# Patient Record
Sex: FEMALE | Race: OTHER | HISPANIC OR LATINO | URBAN - METROPOLITAN AREA
[De-identification: names, ages, dates, MRNs, and addresses within clinical notes are randomized per-mention and may not be internally consistent; named-entity substitution may affect disease eponyms.]

---

## 2018-02-05 ENCOUNTER — EMERGENCY (EMERGENCY)
Facility: HOSPITAL | Age: 23
LOS: 1 days | Discharge: HOME | End: 2018-02-05
Attending: EMERGENCY MEDICINE
Payer: MEDICAID

## 2018-02-05 VITALS
TEMPERATURE: 101 F | RESPIRATION RATE: 20 BRPM | DIASTOLIC BLOOD PRESSURE: 79 MMHG | OXYGEN SATURATION: 99 % | WEIGHT: 136.91 LBS | HEART RATE: 109 BPM | SYSTOLIC BLOOD PRESSURE: 121 MMHG

## 2018-02-05 VITALS
SYSTOLIC BLOOD PRESSURE: 117 MMHG | OXYGEN SATURATION: 100 % | TEMPERATURE: 99 F | RESPIRATION RATE: 18 BRPM | HEART RATE: 88 BPM | DIASTOLIC BLOOD PRESSURE: 69 MMHG

## 2018-02-05 LAB
ALBUMIN SERPL ELPH-MCNC: 4.4 G/DL — SIGNIFICANT CHANGE UP (ref 3.5–5)
ALP SERPL-CCNC: 106 U/L — SIGNIFICANT CHANGE UP (ref 40–120)
ALT FLD-CCNC: 41 U/L DA — SIGNIFICANT CHANGE UP (ref 10–60)
ANION GAP SERPL CALC-SCNC: 10 MMOL/L — SIGNIFICANT CHANGE UP (ref 5–17)
APPEARANCE UR: CLEAR — SIGNIFICANT CHANGE UP
AST SERPL-CCNC: 17 U/L — SIGNIFICANT CHANGE UP (ref 10–40)
BASOPHILS # BLD AUTO: 0.1 K/UL — SIGNIFICANT CHANGE UP (ref 0–0.2)
BASOPHILS NFR BLD AUTO: 0.9 % — SIGNIFICANT CHANGE UP (ref 0–2)
BILIRUB SERPL-MCNC: 0.3 MG/DL — SIGNIFICANT CHANGE UP (ref 0.2–1.2)
BILIRUB UR-MCNC: NEGATIVE — SIGNIFICANT CHANGE UP
BUN SERPL-MCNC: 8 MG/DL — SIGNIFICANT CHANGE UP (ref 7–18)
CALCIUM SERPL-MCNC: 8.6 MG/DL — SIGNIFICANT CHANGE UP (ref 8.4–10.5)
CHLORIDE SERPL-SCNC: 104 MMOL/L — SIGNIFICANT CHANGE UP (ref 96–108)
CO2 SERPL-SCNC: 24 MMOL/L — SIGNIFICANT CHANGE UP (ref 22–31)
COLOR SPEC: YELLOW — SIGNIFICANT CHANGE UP
CREAT SERPL-MCNC: 0.66 MG/DL — SIGNIFICANT CHANGE UP (ref 0.5–1.3)
DIFF PNL FLD: ABNORMAL
EOSINOPHIL # BLD AUTO: 0 K/UL — SIGNIFICANT CHANGE UP (ref 0–0.5)
EOSINOPHIL NFR BLD AUTO: 0.2 % — SIGNIFICANT CHANGE UP (ref 0–6)
FLUBV RNA SPEC QL NAA+PROBE: DETECTED
GLUCOSE SERPL-MCNC: 98 MG/DL — SIGNIFICANT CHANGE UP (ref 70–99)
GLUCOSE UR QL: NEGATIVE — SIGNIFICANT CHANGE UP
HCG UR QL: NEGATIVE — SIGNIFICANT CHANGE UP
HCT VFR BLD CALC: 43.2 % — SIGNIFICANT CHANGE UP (ref 34.5–45)
HGB BLD-MCNC: 13.4 G/DL — SIGNIFICANT CHANGE UP (ref 11.5–15.5)
KETONES UR-MCNC: NEGATIVE — SIGNIFICANT CHANGE UP
LACTATE SERPL-SCNC: 1.2 MMOL/L — SIGNIFICANT CHANGE UP (ref 0.7–2)
LEUKOCYTE ESTERASE UR-ACNC: ABNORMAL
LYMPHOCYTES # BLD AUTO: 0.6 K/UL — LOW (ref 1–3.3)
LYMPHOCYTES # BLD AUTO: 9.7 % — LOW (ref 13–44)
MCHC RBC-ENTMCNC: 28.9 PG — SIGNIFICANT CHANGE UP (ref 27–34)
MCHC RBC-ENTMCNC: 31.1 GM/DL — LOW (ref 32–36)
MCV RBC AUTO: 93 FL — SIGNIFICANT CHANGE UP (ref 80–100)
MONOCYTES # BLD AUTO: 0.5 K/UL — SIGNIFICANT CHANGE UP (ref 0–0.9)
MONOCYTES NFR BLD AUTO: 8.5 % — SIGNIFICANT CHANGE UP (ref 2–14)
NEUTROPHILS # BLD AUTO: 4.9 K/UL — SIGNIFICANT CHANGE UP (ref 1.8–7.4)
NEUTROPHILS NFR BLD AUTO: 80.8 % — HIGH (ref 43–77)
NITRITE UR-MCNC: NEGATIVE — SIGNIFICANT CHANGE UP
PH UR: 6.5 — SIGNIFICANT CHANGE UP (ref 5–8)
PLATELET # BLD AUTO: 233 K/UL — SIGNIFICANT CHANGE UP (ref 150–400)
POTASSIUM SERPL-MCNC: 3.5 MMOL/L — SIGNIFICANT CHANGE UP (ref 3.5–5.3)
POTASSIUM SERPL-SCNC: 3.5 MMOL/L — SIGNIFICANT CHANGE UP (ref 3.5–5.3)
PROT SERPL-MCNC: 9 G/DL — HIGH (ref 6–8.3)
PROT UR-MCNC: NEGATIVE — SIGNIFICANT CHANGE UP
RAPID RVP RESULT: DETECTED
RBC # BLD: 4.65 M/UL — SIGNIFICANT CHANGE UP (ref 3.8–5.2)
RBC # FLD: 12.1 % — SIGNIFICANT CHANGE UP (ref 10.3–14.5)
SODIUM SERPL-SCNC: 138 MMOL/L — SIGNIFICANT CHANGE UP (ref 135–145)
SP GR SPEC: 1.01 — SIGNIFICANT CHANGE UP (ref 1.01–1.02)
UROBILINOGEN FLD QL: NEGATIVE — SIGNIFICANT CHANGE UP
WBC # BLD: 6 K/UL — SIGNIFICANT CHANGE UP (ref 3.8–10.5)
WBC # FLD AUTO: 6 K/UL — SIGNIFICANT CHANGE UP (ref 3.8–10.5)

## 2018-02-05 PROCEDURE — 87486 CHLMYD PNEUM DNA AMP PROBE: CPT

## 2018-02-05 PROCEDURE — 87633 RESP VIRUS 12-25 TARGETS: CPT

## 2018-02-05 PROCEDURE — 99283 EMERGENCY DEPT VISIT LOW MDM: CPT | Mod: 25

## 2018-02-05 PROCEDURE — 71046 X-RAY EXAM CHEST 2 VIEWS: CPT | Mod: 26

## 2018-02-05 PROCEDURE — 87798 DETECT AGENT NOS DNA AMP: CPT

## 2018-02-05 PROCEDURE — 81025 URINE PREGNANCY TEST: CPT

## 2018-02-05 PROCEDURE — 99283 EMERGENCY DEPT VISIT LOW MDM: CPT

## 2018-02-05 PROCEDURE — 80053 COMPREHEN METABOLIC PANEL: CPT

## 2018-02-05 PROCEDURE — 83605 ASSAY OF LACTIC ACID: CPT

## 2018-02-05 PROCEDURE — 87581 M.PNEUMON DNA AMP PROBE: CPT

## 2018-02-05 PROCEDURE — 81001 URINALYSIS AUTO W/SCOPE: CPT

## 2018-02-05 PROCEDURE — 71046 X-RAY EXAM CHEST 2 VIEWS: CPT

## 2018-02-05 PROCEDURE — 85027 COMPLETE CBC AUTOMATED: CPT

## 2018-02-05 RX ORDER — SODIUM CHLORIDE 9 MG/ML
1000 INJECTION INTRAMUSCULAR; INTRAVENOUS; SUBCUTANEOUS ONCE
Qty: 0 | Refills: 0 | Status: COMPLETED | OUTPATIENT
Start: 2018-02-05 | End: 2018-02-05

## 2018-02-05 RX ORDER — IBUPROFEN 200 MG
600 TABLET ORAL ONCE
Qty: 0 | Refills: 0 | Status: COMPLETED | OUTPATIENT
Start: 2018-02-05 | End: 2018-02-05

## 2018-02-05 RX ADMIN — Medication 600 MILLIGRAM(S): at 18:17

## 2018-02-05 RX ADMIN — SODIUM CHLORIDE 1000 MILLILITER(S): 9 INJECTION INTRAMUSCULAR; INTRAVENOUS; SUBCUTANEOUS at 18:17

## 2018-02-05 NOTE — ED PROVIDER NOTE - ATTENDING CONTRIBUTION TO CARE
pt comes in c/o intermittent fever myalgias , anorexia , pleuritic chest pain and meds , steroids   exam wnl

## 2018-02-05 NOTE — ED PROVIDER NOTE - PHYSICAL EXAMINATION
PE: GEN: Awake, alert, interactive, NAD, non-toxic appearing.   HEAD: Atraumatic  EYES: PERRLA, sclera white, conjunctiva pink  EARS: bilat cerumen impaction  NOSE: patent, without obstruction or congestion, no epistaxis  MOUTH: Patent, uvula midline, no tonsillar edema/erythema/exudate, no pharyngeal erythema  CARDIAC: FAST rate and rhythm, S1,S2, no murmur/rub/gallop. Strong central and peripheral pulses, Brisk cap refill, no evident pedal edema.   RESP: MILD distress noted. L/S clear = Bilat without accessory muscle use, wheeze, rhonchi, rales.   ABD: soft, supple, non-tender, no guarding. BS x 4, normoactive.   NEURO: AOx3, CN II-XII grossly intact without focal deficit.   MSK: Moving all extremities with no apparent deformities. neg CVAT  SKIN: Warm, dry, normal color, without apparent rashes.

## 2018-02-05 NOTE — ED PROVIDER NOTE - NS ED ROS FT
Constitutional: + Fever, + Chills, + Anorexia, + Fatigue  Eyes: + Discharge, - Irritation, - Redness, - Visual changes, - Light sensitivity  EARS: - Ear Pain, - Apparent hearing problems  NOSE: + Congestion, - Bloody nose  MOUTH/THROAT: - Vocal Changes, - Drooling, - Sore throat  NECK: - Lumps, - Stiffness, - Pain  CV: - Palpitations, - Chest Pain, - Edema   RESP:  + Cough, + Shortness of Breath, - Dyspnea on Exertion, - Trouble speaking, + Pain with breathing, - Wheezing  GI: + Diarrhea, - Constipation, - Bloody stools, - Nausea, - Vomiting, - Abdominal Pain  : - Dysuria, -Frequency, - Hematuria, - Hesitancy, - Incontinence  MSK: + Myalgias, - Arthralgias, - Weakness, - Deformities, - Injuries  SKIN: - Color change, - Rash, - Swelling, - Bruises, - Wounds  NEURO: - Change in behavior, - Dec. Alertness, - Headache, - Dizziness, - Change in speech, + Weakness

## 2018-02-05 NOTE — ED PROVIDER NOTE - OBJECTIVE STATEMENT
this is a 21 yo fem, reportedly healthy, presenting to the ED with complaints of persistent intermittent chills, myalgias, anorexia, pleuritic chest pain, cough/congestion, shortness of breath despite being on augmenting, prednisone, tessalon Perles from another ED on Saturday. states symptoms have persisted for 3 weeks. pt endorses feeling weak and had an episode of diarrhea this morning. pt has no other complaints.

## 2018-02-06 LAB
CULTURE RESULTS: NO GROWTH — SIGNIFICANT CHANGE UP
SPECIMEN SOURCE: SIGNIFICANT CHANGE UP

## 2018-02-11 LAB
CULTURE RESULTS: SIGNIFICANT CHANGE UP
CULTURE RESULTS: SIGNIFICANT CHANGE UP
SPECIMEN SOURCE: SIGNIFICANT CHANGE UP
SPECIMEN SOURCE: SIGNIFICANT CHANGE UP

## 2018-05-04 NOTE — ED ADULT NURSE NOTE - PT NEEDS ASSIST
Pt resting in bed with call light in reach. Comfort needs addressed. IV fluids infusing. No further needs at this time. Pt was voided while in the bathroom, but forgot to collect specimen. Pt will attempt to collect specimen later.    no

## 2019-01-02 NOTE — ED PROVIDER NOTE - PMH
Erythromycin Counseling:  I discussed with the patient the risks of erythromycin including but not limited to GI upset, allergic reaction, drug rash, diarrhea, increase in liver enzymes, and yeast infections. High Dose Vitamin A Pregnancy And Lactation Text: High dose vitamin A therapy is contraindicated during pregnancy and breast feeding. Tetracycline Pregnancy And Lactation Text: This medication is Pregnancy Category D and not consider safe during pregnancy. It is also excreted in breast milk. Tazorac Pregnancy And Lactation Text: This medication is not safe during pregnancy. It is unknown if this medication is excreted in breast milk. Topical Clindamycin Pregnancy And Lactation Text: This medication is Pregnancy Category B and is considered safe during pregnancy. It is unknown if it is excreted in breast milk. Topical Sulfur Applications Counseling: Topical Sulfur Counseling: Patient counseled that this medication may cause skin irritation or allergic reactions.  In the event of skin irritation, the patient was advised to reduce the amount of the drug applied or use it less frequently.   The patient verbalized understanding of the proper use and possible adverse effects of topical sulfur application.  All of the patient's questions and concerns were addressed. Isotretinoin Pregnancy And Lactation Text: This medication is Pregnancy Category X and is considered extremely dangerous during pregnancy. It is unknown if it is excreted in breast milk. Birth Control Pills Counseling: Birth Control Pill Counseling: I discussed with the patient the potential side effects of OCPs including but not limited to increased risk of stroke, heart attack, thrombophlebitis, deep venous thrombosis, hepatic adenomas, breast changes, GI upset, headaches, and depression.  The patient verbalized understanding of the proper use and possible adverse effects of OCPs. All of the patient's questions and concerns were addressed. Spironolactone Pregnancy And Lactation Text: This medication can cause feminization of the male fetus and should be avoided during pregnancy. The active metabolite is also found in breast milk. Minocycline Counseling: Patient advised regarding possible photosensitivity and discoloration of the teeth, skin, lips, tongue and gums.  Patient instructed to avoid sunlight, if possible.  When exposed to sunlight, patients should wear protective clothing, sunglasses, and sunscreen.  The patient was instructed to call the office immediately if the following severe adverse effects occur:  hearing changes, easy bruising/bleeding, severe headache, or vision changes.  The patient verbalized understanding of the proper use and possible adverse effects of minocycline.  All of the patient's questions and concerns were addressed. Include Pregnancy/Lactation Warning?: No Dapsone Pregnancy And Lactation Text: This medication is Pregnancy Category C and is not considered safe during pregnancy or breast feeding. Birth Control Pills Pregnancy And Lactation Text: This medication should be avoided if pregnant and for the first 30 days post-partum. Tazorac Counseling:  Patient advised that medication is irritating and drying.  Patient may need to apply sparingly and wash off after an hour before eventually leaving it on overnight.  The patient verbalized understanding of the proper use and possible adverse effects of tazorac.  All of the patient's questions and concerns were addressed. No pertinent past medical history Topical Clindamycin Counseling: Patient counseled that this medication may cause skin irritation or allergic reactions.  In the event of skin irritation, the patient was advised to reduce the amount of the drug applied or use it less frequently.   The patient verbalized understanding of the proper use and possible adverse effects of clindamycin.  All of the patient's questions and concerns were addressed. Topical Sulfur Applications Pregnancy And Lactation Text: This medication is Pregnancy Category C and has an unknown safety profile during pregnancy. It is unknown if this topical medication is excreted in breast milk. Spironolactone Counseling: Patient advised regarding risks of diarrhea, abdominal pain, hyperkalemia, birth defects (for female patients), liver toxicity and renal toxicity. The patient may need blood work to monitor liver and kidney function and potassium levels while on therapy. The patient verbalized understanding of the proper use and possible adverse effects of spironolactone.  All of the patient's questions and concerns were addressed. High Dose Vitamin A Counseling: Side effects reviewed, pt to contact office should one occur. Azithromycin Counseling:  I discussed with the patient the risks of azithromycin including but not limited to GI upset, allergic reaction, drug rash, diarrhea, and yeast infections. Erythromycin Pregnancy And Lactation Text: This medication is Pregnancy Category B and is considered safe during pregnancy. It is also excreted in breast milk. Tetracycline Counseling: Patient counseled regarding possible photosensitivity and increased risk for sunburn.  Patient instructed to avoid sunlight, if possible.  When exposed to sunlight, patients should wear protective clothing, sunglasses, and sunscreen.  The patient was instructed to call the office immediately if the following severe adverse effects occur:  hearing changes, easy bruising/bleeding, severe headache, or vision changes.  The patient verbalized understanding of the proper use and possible adverse effects of tetracycline.  All of the patient's questions and concerns were addressed. Patient understands to avoid pregnancy while on therapy due to potential birth defects. Detail Level: Detailed Benzoyl Peroxide Pregnancy And Lactation Text: This medication is Pregnancy Category C. It is unknown if benzoyl peroxide is excreted in breast milk. Isotretinoin Counseling: Patient should get monthly blood tests, not donate blood, not drive at night if vision affected, not share medication, and not undergo elective surgery for 6 months after tx completed. Side effects reviewed, pt to contact office should one occur. Bactrim Counseling:  I discussed with the patient the risks of sulfa antibiotics including but not limited to GI upset, allergic reaction, drug rash, diarrhea, dizziness, photosensitivity, and yeast infections.  Rarely, more serious reactions can occur including but not limited to aplastic anemia, agranulocytosis, methemoglobinemia, blood dyscrasias, liver or kidney failure, lung infiltrates or desquamative/blistering drug rashes. Topical Retinoid counseling:  Patient advised to apply a pea-sized amount only at bedtime and wait 30 minutes after washing their face before applying.  If too drying, patient may add a non-comedogenic moisturizer. The patient verbalized understanding of the proper use and possible adverse effects of retinoids.  All of the patient's questions and concerns were addressed. Topical Retinoid Pregnancy And Lactation Text: This medication is Pregnancy Category C. It is unknown if this medication is excreted in breast milk. Dapsone Counseling: I discussed with the patient the risks of dapsone including but not limited to hemolytic anemia, agranulocytosis, rashes, methemoglobinemia, kidney failure, peripheral neuropathy, headaches, GI upset, and liver toxicity.  Patients who start dapsone require monitoring including baseline LFTs and weekly CBCs for the first month, then every month thereafter.  The patient verbalized understanding of the proper use and possible adverse effects of dapsone.  All of the patient's questions and concerns were addressed. Azithromycin Pregnancy And Lactation Text: This medication is considered safe during pregnancy and is also secreted in breast milk. Benzoyl Peroxide Counseling: Patient counseled that medicine may cause skin irritation and bleach clothing.  In the event of skin irritation, the patient was advised to reduce the amount of the drug applied or use it less frequently.   The patient verbalized understanding of the proper use and possible adverse effects of benzoyl peroxide.  All of the patient's questions and concerns were addressed. Doxycycline Pregnancy And Lactation Text: This medication is Pregnancy Category D and not consider safe during pregnancy. It is also excreted in breast milk but is considered safe for shorter treatment courses. Bactrim Pregnancy And Lactation Text: This medication is Pregnancy Category D and is known to cause fetal risk.  It is also excreted in breast milk. Detail Level: Zone Doxycycline Counseling:  Patient counseled regarding possible photosensitivity and increased risk for sunburn.  Patient instructed to avoid sunlight, if possible.  When exposed to sunlight, patients should wear protective clothing, sunglasses, and sunscreen.  The patient was instructed to call the office immediately if the following severe adverse effects occur:  hearing changes, easy bruising/bleeding, severe headache, or vision changes.  The patient verbalized understanding of the proper use and possible adverse effects of doxycycline.  All of the patient's questions and concerns were addressed.

## 2023-04-21 ENCOUNTER — EMERGENCY (EMERGENCY)
Facility: HOSPITAL | Age: 28
LOS: 1 days | Discharge: HOME | End: 2023-04-21
Attending: STUDENT IN AN ORGANIZED HEALTH CARE EDUCATION/TRAINING PROGRAM
Payer: MEDICAID

## 2023-04-21 VITALS
TEMPERATURE: 100 F | DIASTOLIC BLOOD PRESSURE: 79 MMHG | RESPIRATION RATE: 18 BRPM | SYSTOLIC BLOOD PRESSURE: 124 MMHG | WEIGHT: 161.38 LBS | HEART RATE: 80 BPM | OXYGEN SATURATION: 100 %

## 2023-04-21 LAB
ALBUMIN SERPL ELPH-MCNC: 3.6 G/DL — SIGNIFICANT CHANGE UP (ref 3.5–5)
ALP SERPL-CCNC: 78 U/L — SIGNIFICANT CHANGE UP (ref 40–120)
ALT FLD-CCNC: 51 U/L DA — SIGNIFICANT CHANGE UP (ref 10–60)
ANION GAP SERPL CALC-SCNC: 5 MMOL/L — SIGNIFICANT CHANGE UP (ref 5–17)
AST SERPL-CCNC: 28 U/L — SIGNIFICANT CHANGE UP (ref 10–40)
BASOPHILS # BLD AUTO: 0.04 K/UL — SIGNIFICANT CHANGE UP (ref 0–0.2)
BASOPHILS NFR BLD AUTO: 0.5 % — SIGNIFICANT CHANGE UP (ref 0–2)
BILIRUB SERPL-MCNC: 0.5 MG/DL — SIGNIFICANT CHANGE UP (ref 0.2–1.2)
BLD GP AB SCN SERPL QL: SIGNIFICANT CHANGE UP
BUN SERPL-MCNC: 9 MG/DL — SIGNIFICANT CHANGE UP (ref 7–18)
CALCIUM SERPL-MCNC: 9.5 MG/DL — SIGNIFICANT CHANGE UP (ref 8.4–10.5)
CHLORIDE SERPL-SCNC: 108 MMOL/L — SIGNIFICANT CHANGE UP (ref 96–108)
CO2 SERPL-SCNC: 26 MMOL/L — SIGNIFICANT CHANGE UP (ref 22–31)
CREAT SERPL-MCNC: 0.64 MG/DL — SIGNIFICANT CHANGE UP (ref 0.5–1.3)
EGFR: 123 ML/MIN/1.73M2 — SIGNIFICANT CHANGE UP
EOSINOPHIL # BLD AUTO: 0.18 K/UL — SIGNIFICANT CHANGE UP (ref 0–0.5)
EOSINOPHIL NFR BLD AUTO: 2 % — SIGNIFICANT CHANGE UP (ref 0–6)
GLUCOSE SERPL-MCNC: 107 MG/DL — HIGH (ref 70–99)
HCG SERPL-ACNC: HIGH MIU/ML
HCT VFR BLD CALC: 35.2 % — SIGNIFICANT CHANGE UP (ref 34.5–45)
HGB BLD-MCNC: 11.6 G/DL — SIGNIFICANT CHANGE UP (ref 11.5–15.5)
IMM GRANULOCYTES NFR BLD AUTO: 0.2 % — SIGNIFICANT CHANGE UP (ref 0–0.9)
LYMPHOCYTES # BLD AUTO: 2 K/UL — SIGNIFICANT CHANGE UP (ref 1–3.3)
LYMPHOCYTES # BLD AUTO: 22.5 % — SIGNIFICANT CHANGE UP (ref 13–44)
MAGNESIUM SERPL-MCNC: 2.2 MG/DL — SIGNIFICANT CHANGE UP (ref 1.6–2.6)
MCHC RBC-ENTMCNC: 29.5 PG — SIGNIFICANT CHANGE UP (ref 27–34)
MCHC RBC-ENTMCNC: 33 GM/DL — SIGNIFICANT CHANGE UP (ref 32–36)
MCV RBC AUTO: 89.6 FL — SIGNIFICANT CHANGE UP (ref 80–100)
MONOCYTES # BLD AUTO: 0.46 K/UL — SIGNIFICANT CHANGE UP (ref 0–0.9)
MONOCYTES NFR BLD AUTO: 5.2 % — SIGNIFICANT CHANGE UP (ref 2–14)
NEUTROPHILS # BLD AUTO: 6.17 K/UL — SIGNIFICANT CHANGE UP (ref 1.8–7.4)
NEUTROPHILS NFR BLD AUTO: 69.6 % — SIGNIFICANT CHANGE UP (ref 43–77)
NRBC # BLD: 0 /100 WBCS — SIGNIFICANT CHANGE UP (ref 0–0)
PLATELET # BLD AUTO: 304 K/UL — SIGNIFICANT CHANGE UP (ref 150–400)
POTASSIUM SERPL-MCNC: 4.3 MMOL/L — SIGNIFICANT CHANGE UP (ref 3.5–5.3)
POTASSIUM SERPL-SCNC: 4.3 MMOL/L — SIGNIFICANT CHANGE UP (ref 3.5–5.3)
PROT SERPL-MCNC: 7.6 G/DL — SIGNIFICANT CHANGE UP (ref 6–8.3)
RBC # BLD: 3.93 M/UL — SIGNIFICANT CHANGE UP (ref 3.8–5.2)
RBC # FLD: 13.2 % — SIGNIFICANT CHANGE UP (ref 10.3–14.5)
SODIUM SERPL-SCNC: 139 MMOL/L — SIGNIFICANT CHANGE UP (ref 135–145)
WBC # BLD: 8.87 K/UL — SIGNIFICANT CHANGE UP (ref 3.8–10.5)
WBC # FLD AUTO: 8.87 K/UL — SIGNIFICANT CHANGE UP (ref 3.8–10.5)

## 2023-04-21 PROCEDURE — 83735 ASSAY OF MAGNESIUM: CPT

## 2023-04-21 PROCEDURE — 99284 EMERGENCY DEPT VISIT MOD MDM: CPT

## 2023-04-21 PROCEDURE — 84702 CHORIONIC GONADOTROPIN TEST: CPT

## 2023-04-21 PROCEDURE — 86850 RBC ANTIBODY SCREEN: CPT

## 2023-04-21 PROCEDURE — 86901 BLOOD TYPING SEROLOGIC RH(D): CPT

## 2023-04-21 PROCEDURE — 76801 OB US < 14 WKS SINGLE FETUS: CPT

## 2023-04-21 PROCEDURE — 86900 BLOOD TYPING SEROLOGIC ABO: CPT

## 2023-04-21 PROCEDURE — 80053 COMPREHEN METABOLIC PANEL: CPT

## 2023-04-21 PROCEDURE — 76817 TRANSVAGINAL US OBSTETRIC: CPT

## 2023-04-21 PROCEDURE — 99284 EMERGENCY DEPT VISIT MOD MDM: CPT | Mod: 25

## 2023-04-21 PROCEDURE — 36415 COLL VENOUS BLD VENIPUNCTURE: CPT

## 2023-04-21 PROCEDURE — 85025 COMPLETE CBC W/AUTO DIFF WBC: CPT

## 2023-04-21 NOTE — ED PROVIDER NOTE - CLINICAL SUMMARY MEDICAL DECISION MAKING FREE TEXT BOX
27F presenting with abdominal pain and vaginal bleeding during pregnancy. concern for molar pregnancy vs ectopic vs miscarriage. will get labs, US. will reassess.

## 2023-04-21 NOTE — ED PROVIDER NOTE - PROGRESS NOTE DETAILS
patient and family updated on results. has OB followup on Monday. patient now reporting that she passed a sac like structure while at home. stable for outpatient followup.

## 2023-04-21 NOTE — ED PROVIDER NOTE - PATIENT PORTAL LINK FT
You can access the FollowMyHealth Patient Portal offered by [facility] by registering at the following website: [email or web address]. By joining [employer]’s FollowMyHealth portal, you will also be able to view your health information using other applications (apps) compatible with our system.

## 2023-04-21 NOTE — ED PROVIDER NOTE - OBJECTIVE STATEMENT
27F, 5 weeks gestation, presenting with abdominal pain and vaginal bleeding. patient had ultrasound with her OB today and there was concern for molar pregnancy. around 6pm she began to have heavy vaginal bleeding and some right lower abdominal pain.

## 2023-04-21 NOTE — ED PROVIDER NOTE - PHYSICAL EXAMINATION
General: well appearing female, no acute distress   HEENT: normocephalic, atraumatic   Respiratory: normal work of breathing  Abdomen: soft, RLQ tenderness to palpation   MSK: no swelling or tenderness of lower extremities, moving all extremities spontaneously   Skin: warm, dry   Neuro: A&Ox3  Psych: appropriate affect

## 2023-04-21 NOTE — ED PROVIDER NOTE - NSFOLLOWUPINSTRUCTIONS_ED_ALL_ED_FT
You were seen in the emergency department for abdominal pain and vaginal bleeding during pregnancy.     Please follow-up your OB/GYN within the next 48 hours.     If you have any worsening symptoms, severe abdominal pain, chest pain, trouble breathing dizziness, please return to the emergency department.

## 2023-04-22 VITALS
TEMPERATURE: 98 F | SYSTOLIC BLOOD PRESSURE: 111 MMHG | RESPIRATION RATE: 18 BRPM | OXYGEN SATURATION: 99 % | HEART RATE: 82 BPM | DIASTOLIC BLOOD PRESSURE: 72 MMHG

## 2023-04-22 PROCEDURE — 76801 OB US < 14 WKS SINGLE FETUS: CPT | Mod: 26

## 2023-04-22 PROCEDURE — 76817 TRANSVAGINAL US OBSTETRIC: CPT | Mod: 26

## 2023-04-22 NOTE — ED ADULT NURSE NOTE - NS_SISCREENINGSR_GEN_ALL_ED
Patient for monthly catheter change per provider. Existing catheter balloon deflated and removed without difficulty. Patient prepped in usual sterile fashion. 2% lidocaine urojet used prior to insertion of 18F coude catheter. About 10 ml of yellow urine returned. Balloon filled with 10 mL sterile water. Catheter secured with new stat lock, connected to tubing and leg bag. Patient tolerated procedure well and will follow up as needed/directed or in 4 weeks for catheter change.    
Negative

## 2023-07-27 NOTE — ED ADULT NURSE NOTE - CHIEF COMPLAINT QUOTE
flu-like symptoms x 3 weeks Tremfya Pregnancy And Lactation Text: The risk during pregnancy and breastfeeding is uncertain with this medication.

## 2023-11-02 ENCOUNTER — EMERGENCY (EMERGENCY)
Facility: HOSPITAL | Age: 28
LOS: 1 days | End: 2023-11-02
Attending: STUDENT IN AN ORGANIZED HEALTH CARE EDUCATION/TRAINING PROGRAM
Payer: MEDICAID

## 2023-11-02 ENCOUNTER — OUTPATIENT (OUTPATIENT)
Dept: OUTPATIENT SERVICES | Facility: HOSPITAL | Age: 28
LOS: 1 days | End: 2023-11-02
Payer: MEDICAID

## 2023-11-02 VITALS
RESPIRATION RATE: 18 BRPM | HEIGHT: 60 IN | HEART RATE: 114 BPM | WEIGHT: 164.91 LBS | TEMPERATURE: 98 F | OXYGEN SATURATION: 99 % | SYSTOLIC BLOOD PRESSURE: 126 MMHG | DIASTOLIC BLOOD PRESSURE: 85 MMHG

## 2023-11-02 DIAGNOSIS — Z3A.00 WEEKS OF GESTATION OF PREGNANCY NOT SPECIFIED: ICD-10-CM

## 2023-11-02 DIAGNOSIS — O26.899 OTHER SPECIFIED PREGNANCY RELATED CONDITIONS, UNSPECIFIED TRIMESTER: ICD-10-CM

## 2023-11-02 PROCEDURE — 99285 EMERGENCY DEPT VISIT HI MDM: CPT

## 2023-11-02 PROCEDURE — 99282 EMERGENCY DEPT VISIT SF MDM: CPT

## 2023-11-02 NOTE — ED PROVIDER NOTE - CLINICAL SUMMARY MEDICAL DECISION MAKING FREE TEXT BOX
Rapid assessment -   Pt reports she is 6months pregnant. Having itchiness. VSS. Well appearing. No distress. Ok to go to L&D for assessment since patient is more than 20 week pregnant.

## 2023-11-02 NOTE — ED ADULT TRIAGE NOTE - CHIEF COMPLAINT QUOTE
c/o generalized itchiness since 2 pm, denies any swelling or tightness in oral airway or shortness of breath, pt states she is approx 6 months pregnant, denies any GYN complaints

## 2023-11-02 NOTE — ED ADULT NURSE NOTE - NS ED NURSE NOTE DISPO AOU DETAILS FT
Pt stated she is 6 weeks pregnant c/o generalized body itchy, denies abd or back pain or vaginal bleeding or leaking. pt was seen by Dr weir.

## 2023-11-03 PROCEDURE — 59025 FETAL NON-STRESS TEST: CPT

## 2023-11-03 PROCEDURE — G0463: CPT

## 2023-11-03 RX ORDER — DIPHENHYDRAMINE HCL 50 MG
25 CAPSULE ORAL ONCE
Refills: 0 | Status: COMPLETED | OUTPATIENT
Start: 2023-11-03 | End: 2023-11-03

## 2023-11-03 RX ADMIN — Medication 25 MILLIGRAM(S): at 00:20

## 2024-01-15 ENCOUNTER — OUTPATIENT (OUTPATIENT)
Dept: OUTPATIENT SERVICES | Facility: HOSPITAL | Age: 29
LOS: 1 days | End: 2024-01-15
Payer: MEDICAID

## 2024-01-15 VITALS
SYSTOLIC BLOOD PRESSURE: 119 MMHG | HEART RATE: 118 BPM | TEMPERATURE: 98 F | DIASTOLIC BLOOD PRESSURE: 76 MMHG | RESPIRATION RATE: 17 BRPM | OXYGEN SATURATION: 96 %

## 2024-01-15 DIAGNOSIS — Z3A.00 WEEKS OF GESTATION OF PREGNANCY NOT SPECIFIED: ICD-10-CM

## 2024-01-15 DIAGNOSIS — O26.899 OTHER SPECIFIED PREGNANCY RELATED CONDITIONS, UNSPECIFIED TRIMESTER: ICD-10-CM

## 2024-01-15 LAB
ALBUMIN SERPL ELPH-MCNC: 2.8 G/DL — LOW (ref 3.5–5)
ALBUMIN SERPL ELPH-MCNC: 2.8 G/DL — LOW (ref 3.5–5)
ALP SERPL-CCNC: 219 U/L — HIGH (ref 40–120)
ALP SERPL-CCNC: 219 U/L — HIGH (ref 40–120)
ALT FLD-CCNC: 19 U/L DA — SIGNIFICANT CHANGE UP (ref 10–60)
ALT FLD-CCNC: 19 U/L DA — SIGNIFICANT CHANGE UP (ref 10–60)
AMYLASE P1 CFR SERPL: 64 U/L — SIGNIFICANT CHANGE UP (ref 25–115)
AMYLASE P1 CFR SERPL: 64 U/L — SIGNIFICANT CHANGE UP (ref 25–115)
ANION GAP SERPL CALC-SCNC: 9 MMOL/L — SIGNIFICANT CHANGE UP (ref 5–17)
ANION GAP SERPL CALC-SCNC: 9 MMOL/L — SIGNIFICANT CHANGE UP (ref 5–17)
APPEARANCE UR: ABNORMAL
APPEARANCE UR: ABNORMAL
AST SERPL-CCNC: 23 U/L — SIGNIFICANT CHANGE UP (ref 10–40)
AST SERPL-CCNC: 23 U/L — SIGNIFICANT CHANGE UP (ref 10–40)
BASOPHILS # BLD AUTO: 0.02 K/UL — SIGNIFICANT CHANGE UP (ref 0–0.2)
BASOPHILS # BLD AUTO: 0.02 K/UL — SIGNIFICANT CHANGE UP (ref 0–0.2)
BASOPHILS NFR BLD AUTO: 0.2 % — SIGNIFICANT CHANGE UP (ref 0–2)
BASOPHILS NFR BLD AUTO: 0.2 % — SIGNIFICANT CHANGE UP (ref 0–2)
BILIRUB SERPL-MCNC: 0.8 MG/DL — SIGNIFICANT CHANGE UP (ref 0.2–1.2)
BILIRUB SERPL-MCNC: 0.8 MG/DL — SIGNIFICANT CHANGE UP (ref 0.2–1.2)
BILIRUB UR-MCNC: NEGATIVE — SIGNIFICANT CHANGE UP
BILIRUB UR-MCNC: NEGATIVE — SIGNIFICANT CHANGE UP
BUN SERPL-MCNC: 13 MG/DL — SIGNIFICANT CHANGE UP (ref 7–18)
BUN SERPL-MCNC: 13 MG/DL — SIGNIFICANT CHANGE UP (ref 7–18)
CALCIUM SERPL-MCNC: 9.1 MG/DL — SIGNIFICANT CHANGE UP (ref 8.4–10.5)
CALCIUM SERPL-MCNC: 9.1 MG/DL — SIGNIFICANT CHANGE UP (ref 8.4–10.5)
CHLORIDE SERPL-SCNC: 107 MMOL/L — SIGNIFICANT CHANGE UP (ref 96–108)
CHLORIDE SERPL-SCNC: 107 MMOL/L — SIGNIFICANT CHANGE UP (ref 96–108)
CO2 SERPL-SCNC: 20 MMOL/L — LOW (ref 22–31)
CO2 SERPL-SCNC: 20 MMOL/L — LOW (ref 22–31)
COLOR SPEC: SIGNIFICANT CHANGE UP
COLOR SPEC: SIGNIFICANT CHANGE UP
CREAT SERPL-MCNC: 0.54 MG/DL — SIGNIFICANT CHANGE UP (ref 0.5–1.3)
CREAT SERPL-MCNC: 0.54 MG/DL — SIGNIFICANT CHANGE UP (ref 0.5–1.3)
DIFF PNL FLD: NEGATIVE — SIGNIFICANT CHANGE UP
DIFF PNL FLD: NEGATIVE — SIGNIFICANT CHANGE UP
EGFR: 128 ML/MIN/1.73M2 — SIGNIFICANT CHANGE UP
EGFR: 128 ML/MIN/1.73M2 — SIGNIFICANT CHANGE UP
EOSINOPHIL # BLD AUTO: 0.01 K/UL — SIGNIFICANT CHANGE UP (ref 0–0.5)
EOSINOPHIL # BLD AUTO: 0.01 K/UL — SIGNIFICANT CHANGE UP (ref 0–0.5)
EOSINOPHIL NFR BLD AUTO: 0.1 % — SIGNIFICANT CHANGE UP (ref 0–6)
EOSINOPHIL NFR BLD AUTO: 0.1 % — SIGNIFICANT CHANGE UP (ref 0–6)
GLUCOSE SERPL-MCNC: 114 MG/DL — HIGH (ref 70–99)
GLUCOSE SERPL-MCNC: 114 MG/DL — HIGH (ref 70–99)
GLUCOSE UR QL: NEGATIVE MG/DL — SIGNIFICANT CHANGE UP
GLUCOSE UR QL: NEGATIVE MG/DL — SIGNIFICANT CHANGE UP
HCT VFR BLD CALC: 34.5 % — SIGNIFICANT CHANGE UP (ref 34.5–45)
HCT VFR BLD CALC: 34.5 % — SIGNIFICANT CHANGE UP (ref 34.5–45)
HGB BLD-MCNC: 10.8 G/DL — LOW (ref 11.5–15.5)
HGB BLD-MCNC: 10.8 G/DL — LOW (ref 11.5–15.5)
IMM GRANULOCYTES NFR BLD AUTO: 0.5 % — SIGNIFICANT CHANGE UP (ref 0–0.9)
IMM GRANULOCYTES NFR BLD AUTO: 0.5 % — SIGNIFICANT CHANGE UP (ref 0–0.9)
KETONES UR-MCNC: >=160 MG/DL
KETONES UR-MCNC: >=160 MG/DL
LEUKOCYTE ESTERASE UR-ACNC: ABNORMAL
LEUKOCYTE ESTERASE UR-ACNC: ABNORMAL
LIDOCAIN IGE QN: 21 U/L — SIGNIFICANT CHANGE UP (ref 13–75)
LIDOCAIN IGE QN: 21 U/L — SIGNIFICANT CHANGE UP (ref 13–75)
LYMPHOCYTES # BLD AUTO: 0.57 K/UL — LOW (ref 1–3.3)
LYMPHOCYTES # BLD AUTO: 0.57 K/UL — LOW (ref 1–3.3)
LYMPHOCYTES # BLD AUTO: 4.5 % — LOW (ref 13–44)
LYMPHOCYTES # BLD AUTO: 4.5 % — LOW (ref 13–44)
MCHC RBC-ENTMCNC: 25.3 PG — LOW (ref 27–34)
MCHC RBC-ENTMCNC: 25.3 PG — LOW (ref 27–34)
MCHC RBC-ENTMCNC: 31.3 GM/DL — LOW (ref 32–36)
MCHC RBC-ENTMCNC: 31.3 GM/DL — LOW (ref 32–36)
MCV RBC AUTO: 80.8 FL — SIGNIFICANT CHANGE UP (ref 80–100)
MCV RBC AUTO: 80.8 FL — SIGNIFICANT CHANGE UP (ref 80–100)
MONOCYTES # BLD AUTO: 0.25 K/UL — SIGNIFICANT CHANGE UP (ref 0–0.9)
MONOCYTES # BLD AUTO: 0.25 K/UL — SIGNIFICANT CHANGE UP (ref 0–0.9)
MONOCYTES NFR BLD AUTO: 2 % — SIGNIFICANT CHANGE UP (ref 2–14)
MONOCYTES NFR BLD AUTO: 2 % — SIGNIFICANT CHANGE UP (ref 2–14)
NEUTROPHILS # BLD AUTO: 11.77 K/UL — HIGH (ref 1.8–7.4)
NEUTROPHILS # BLD AUTO: 11.77 K/UL — HIGH (ref 1.8–7.4)
NEUTROPHILS NFR BLD AUTO: 92.7 % — HIGH (ref 43–77)
NEUTROPHILS NFR BLD AUTO: 92.7 % — HIGH (ref 43–77)
NITRITE UR-MCNC: NEGATIVE — SIGNIFICANT CHANGE UP
NITRITE UR-MCNC: NEGATIVE — SIGNIFICANT CHANGE UP
NRBC # BLD: 0 /100 WBCS — SIGNIFICANT CHANGE UP (ref 0–0)
NRBC # BLD: 0 /100 WBCS — SIGNIFICANT CHANGE UP (ref 0–0)
PH UR: 6 — SIGNIFICANT CHANGE UP (ref 5–8)
PH UR: 6 — SIGNIFICANT CHANGE UP (ref 5–8)
PLATELET # BLD AUTO: 344 K/UL — SIGNIFICANT CHANGE UP (ref 150–400)
PLATELET # BLD AUTO: 344 K/UL — SIGNIFICANT CHANGE UP (ref 150–400)
POTASSIUM SERPL-MCNC: 3.5 MMOL/L — SIGNIFICANT CHANGE UP (ref 3.5–5.3)
POTASSIUM SERPL-MCNC: 3.5 MMOL/L — SIGNIFICANT CHANGE UP (ref 3.5–5.3)
POTASSIUM SERPL-SCNC: 3.5 MMOL/L — SIGNIFICANT CHANGE UP (ref 3.5–5.3)
POTASSIUM SERPL-SCNC: 3.5 MMOL/L — SIGNIFICANT CHANGE UP (ref 3.5–5.3)
PROT SERPL-MCNC: 7.3 G/DL — SIGNIFICANT CHANGE UP (ref 6–8.3)
PROT SERPL-MCNC: 7.3 G/DL — SIGNIFICANT CHANGE UP (ref 6–8.3)
PROT UR-MCNC: 30 MG/DL
PROT UR-MCNC: 30 MG/DL
RBC # BLD: 4.27 M/UL — SIGNIFICANT CHANGE UP (ref 3.8–5.2)
RBC # BLD: 4.27 M/UL — SIGNIFICANT CHANGE UP (ref 3.8–5.2)
RBC # FLD: 15.3 % — HIGH (ref 10.3–14.5)
RBC # FLD: 15.3 % — HIGH (ref 10.3–14.5)
SODIUM SERPL-SCNC: 136 MMOL/L — SIGNIFICANT CHANGE UP (ref 135–145)
SODIUM SERPL-SCNC: 136 MMOL/L — SIGNIFICANT CHANGE UP (ref 135–145)
SP GR SPEC: 1.03 — HIGH (ref 1–1.03)
SP GR SPEC: 1.03 — HIGH (ref 1–1.03)
UROBILINOGEN FLD QL: 1 MG/DL — SIGNIFICANT CHANGE UP (ref 0.2–1)
UROBILINOGEN FLD QL: 1 MG/DL — SIGNIFICANT CHANGE UP (ref 0.2–1)
WBC # BLD: 12.68 K/UL — HIGH (ref 3.8–10.5)
WBC # BLD: 12.68 K/UL — HIGH (ref 3.8–10.5)
WBC # FLD AUTO: 12.68 K/UL — HIGH (ref 3.8–10.5)
WBC # FLD AUTO: 12.68 K/UL — HIGH (ref 3.8–10.5)

## 2024-01-15 PROCEDURE — 96374 THER/PROPH/DIAG INJ IV PUSH: CPT

## 2024-01-15 PROCEDURE — 59025 FETAL NON-STRESS TEST: CPT

## 2024-01-15 PROCEDURE — 80053 COMPREHEN METABOLIC PANEL: CPT

## 2024-01-15 PROCEDURE — 83690 ASSAY OF LIPASE: CPT

## 2024-01-15 PROCEDURE — 99285 EMERGENCY DEPT VISIT HI MDM: CPT

## 2024-01-15 PROCEDURE — 82150 ASSAY OF AMYLASE: CPT

## 2024-01-15 PROCEDURE — 81001 URINALYSIS AUTO W/SCOPE: CPT

## 2024-01-15 PROCEDURE — 36415 COLL VENOUS BLD VENIPUNCTURE: CPT

## 2024-01-15 PROCEDURE — G0463: CPT

## 2024-01-15 PROCEDURE — 96361 HYDRATE IV INFUSION ADD-ON: CPT

## 2024-01-15 PROCEDURE — 85025 COMPLETE CBC W/AUTO DIFF WBC: CPT

## 2024-01-15 PROCEDURE — 99221 1ST HOSP IP/OBS SF/LOW 40: CPT

## 2024-01-15 RX ORDER — ONDANSETRON 8 MG/1
4 TABLET, FILM COATED ORAL ONCE
Refills: 0 | Status: COMPLETED | OUTPATIENT
Start: 2024-01-15 | End: 2024-01-15

## 2024-01-15 RX ORDER — SODIUM CHLORIDE 9 MG/ML
1000 INJECTION, SOLUTION INTRAVENOUS ONCE
Refills: 0 | Status: COMPLETED | OUTPATIENT
Start: 2024-01-15 | End: 2024-01-15

## 2024-01-15 RX ADMIN — SODIUM CHLORIDE 1000 MILLILITER(S): 9 INJECTION, SOLUTION INTRAVENOUS at 12:40

## 2024-01-15 RX ADMIN — ONDANSETRON 4 MILLIGRAM(S): 8 TABLET, FILM COATED ORAL at 12:34

## 2024-01-15 NOTE — OB PROVIDER TRIAGE NOTE - NSOBPROVIDERNOTE_OBGYN_ALL_OB_FT
28 year old  at 33w5d with gastritis - N/V/D after eating outside prepared food 28 year old  at 33w5d with gastritis - N/V/D after eating outside prepared food    Given 3 liters LR  States feels better.  No further episodes of N/V/D  No further contractions on monitor

## 2024-01-15 NOTE — OB PROVIDER TRIAGE NOTE - ADDITIONAL INSTRUCTIONS
-Discharge home with strict precautions  -Report back to triage with vaginal bleeding, leakage of fluid, decreased fetal movement, abdominal or pelvic pain or any other concerns  -Kick counts  -Increase oral hydration  -Simple BRAT diet

## 2024-01-15 NOTE — OB PROVIDER TRIAGE NOTE - NSPROVTRIAGESELHIDDEN_OBGYN_ALL_OB_FT
[NS_AttendInformed_OBGYN_ALL_OB:[id number]] [NS_AttendInformed_OBGYN_ALL_OB:[id number]] [NS_AttendInformed_OBGYN_ALL_OB:[id number]]

## 2024-01-15 NOTE — OB PROVIDER TRIAGE NOTE - NS_OBACUITYLEVEL_OBGYN_ALL_OB
11/15/2023    Mary Hooper  [address]  [facility] [address]    Dear Ms. Hooper,    Your procedure is scheduled with Dr. Buddy Ortiz on January 12, 2024 at 10:45 am at:    [address]  [address]  [phone]  Please enter through the main doors near the Physician's [place] and check in at Day Surgery Registration.     Please register at [facility] on January 12, 2024  at 8:45 am.    These times may change due to various OR schedule needs. We will call you ASAP if this happens.      The following appointment(s) have been scheduled for you:    1st Post-op with Dr. Ortiz: January 18, 2024 at 3:45 pm  at the [facility] ([facility] [place] [address]).    2nd Post-op with Dr. Ortiz: January 25, 2024 at 9:30 am at the [facility] ([facility] [place] [address]).    3rd Post-op with Dr. Ortiz: February 22, 2024 at 9:30 am at the [facility] ([facility] [place] [address]).          To better prepare for your surgery, please follow these instructions:    Please schedule an appointment with your Primary Care Physician for a PreOperative History and Physical for 2-3 weeks before your surgery date.  If you haven't already done so, please call them ASAP to schedule an appointment. Please call us at [phone], when your appointment has been scheduled with your primary care physician. It is OK to leave a voicemail with this information (date, time, and name of your doctor).    All Weight Loss Medication (both traditional weight loss and diabetic medications used to treat weight loss) must be stopped 7 days before surgery or your surgery will be cancelled.  If you are diabetic, please consult with your prescribing physician for any alternative or additional medication instructions before surgery.    Please do not take any aspirin products, anti-inflammatory medication  or blood thinners for 7 days before your surgery.  This includes products such as Liz-Springer, Pepto Bismol, Motrin, Ibuprofen and Advil should also be avoided. TYLENOL OK TO TAKE DURING THIS TIME.    If you take any other prescription medication, including blood thinners such as coumadin or Plavix, please contact your ordering or primary care physician ASAP for direction.     Do not have anything to eat or drink starting at midnight the night before your surgery.     For your safety,  you must have a ride home after surgery. This is due to both anesthesia and post-op pain medication. This must be with someone who can take responsibility for you and assist with your discharge from the surgery center, not a cab, bus, etc. You will need someone available to remain with you up to 24 hours after you have been discharged.    Please remember that all times are subject to change as the hospital coordinates the schedule to meet the needs of your surgery and the overall flow of the OR that day.  You will be called ASAP to advise you of any changes to your surgery time or the time you need to arrive for your surgery.    Pre-Procedural COVID Testing is NOT required as long as you remain symptom-free from now through your surgery date.    At any time, if you experience COVID-like symptoms, please contact your primary care physician for evaluation.    If you test positive for COVID between now and your surgery date, please call our office as soon as possible.  We might need to postpone your procedure until it is safe for you to proceed.    Masking and Social Distancing continue to be crucial.    If you have any work related and/or disability forms that need to be completed, please contact the Forms Completion Department at [phone]. Forms can be dropped off at any of our [facility] locations. Please be advised that it can take 7 to 14 business days to complete these requests.    If you have questions regarding the  procedure, medications, rehab, etc., please contact the nursing staff at [phone].    If you have any scheduling questions or need to reschedule, please contact me at the telephone number and extension listed below.       Thank you,    Stacie/Jared at [phone]  Surgery Scheduler for Dr. Buddy Ortiz  [facility]                   Insurance Authorization Need to Know's    Prior to your surgical procedure, our team will contact your Insurance Company to initiate a PreAuthorization request.      This is not a guarantee of payment from your insurance company, but rather a step taken to ensure that we have all of the information and documentation for them to confirm the procedure is one that is eligible for coverage under your plan.    We will contact you if we either need more information from you to fulfill the requirements of your insurance company, or if we need to discuss any concerns that may lead to postponement or cancellation of your procedure. If you have any questions regarding your surgery authorization, please check with your insurance company or call our office for an update.    If you need information regarding your level of benefits or out-of-pocket expenses, please contact your insurance company directly.  They can also confirm for you whether or not we (the surgeon and the hospital/surgery center) are in your plan's preferred network (aka 'in-network').    What to do if… My Insurance Changes:  If, at any time, your insurance company, plan or even card changes… Please call our office so that our team can be sure to update your records.  We will need to make sure to submit any PreAuth or virginie to the correct, up-to-date insurance plan.      What to do if… My Insurance Requires A Referral:  If your insurance company requires a Referral for Specialty Care or to see a Specialist, you will need to confirm with them if you have one on file.    If your insurance  carrier does not have a referral, then you will need to contact your Primary Care Physician to have one directly submitted to your insurance company ASAP.    Without a referral on file, your insurance company will not Pre-Authorize your surgery and may not cover any of your care with our specialty.    What to do if… I have a Workers Compensation (W/C) Claim:  If you have a W/C claim, please be sure to provide our reception team with the information you have regarding your claim ASAP.  We will contact your W/C carrier/adjustor to inform them of your upcoming surgery and check the status of your claim (open vs closed).  We will let you know if they advise of any concerns or issues with your claim.  Even if you have an open W/C claim, please also provide us with your personal/family insurance.  We will want to be sure this plan is loaded into your account.  We always PreAuthorize with personal insurance as a back-up to W/C.  Otherwise, if W/C doesn't cover something along the way, you will receive a bill for the services.    What to do if… I have Month-to-Month Coverage/Premiums:  If you have an insurance plan that is paid for month to month, or is subject to plan change on a monthly basis, please be aware we cannot initiate PreAuthorization until just before the month of your surgery, as your insurance company will need to verify your premium payments/eligibility first.    What to do if… I Do Not Have Insurance Coverage or Have other Insurance/Billing Questions:  Please call our Patient Contact Center:  [phone] to speak with a team member about your billing needs, including possible coverage options, setting up payment plans, our fee schedule, etc.          MEDICATIONS TO STOP / CHANGE BEFORE SURGERY    Please read through this list to make sure you are adjusting your medication appropriately before surgery.  Failure to do so might result in cancellation or rescheduling surgery.      BLOOD THINNERS /  ANTICOAGULATION MEDICATIONS    If you take prescription medication that is a blood thinner, please contact the prescribing provider or primary care ASAP to determine when to hold the medication prior to surgery.     Examples include:  Warfarin (Coumadin)     Clopidrogel (Plavix)  Apixaban (Eliquis)  Rivaroxaban (Xarelto)    ASPIRIN and ANTI-INFLAMMATORY (NSAID) PRODUCTS     Do not take SEVEN (7) days prior to procedure.      OVER-THE-COUNTER:    Aspirin… including:  Anacin, Yaima, Fallon, Aspergum, Aspercin, Aspermin, Aspertab, Back-quell, Duradyne, Empirin, Gemnisyn, Genprin, Gensan, Magnaprin, McNess Pain Tab, Momentum, P-A-C, Pain Reliever Tabs, Tri-Pain Caplets, Vanquish Caplet.  Buffered Aspirin… including:  Ascriptin, Bufferin, Ecotrin, Buffaprin, Buffasal, Buffinol, COPE.  Aspirin Suppositories (generic, any strength)  Excedrin, Excedrin Extra, Excedrin IB  Ibuprofen… including: Advil, Nuprin, Motrin IB, Adapin, Genpril, Ibufen 200, Menadol, Midol IB, Dristan Sinus, Ursinus Inlay Tabs, Dimetapp Sinus, Valparin, Haltran Tabs, Wicho's Pills, Ahsan.  Naproxen… including: Aleve  Liz [place] or Bromo-Marshal  Pepto Bismol     PRESCRIPTION:    Brand Name Generic Name Brand Name Generic Name      Fiorinal   butalbital, aspirin, caffeine    Lodine   etodolac      Naprosyn, Anaprox   naproxen    Mobic   meloxicam      Voltaren, Cataflam   diclofenac    Meclomen   meclofenamate      Feldene   piroxicam    Nalfon   fenoprofen      Motrin (Rx), Rufen   ibuprofen    Ponstel   mefenamic acid      Ansaid   flurbiprofen    Relafen   nabumetone      Orudis   ketoprofen    Toradol   ketorolac      Dolobid   diflunisal    Azdone Tabs   aspirin plus hydrocodone      Clinoril   sulindac    Percodan   aspirin plus oxycodone      Indocin   indomethacin    Synalgos   aspirin plus dihydrocodeine      Tolectin   tolmetin    Daypro   oxaprozin     WEIGHT LOSS MEDICATIONS    If you are taking these medications and have DIABETES please  contact your primary care doctor about when to stop these medications and whether you will need an alternative medication.    If on WEEKLY dosing, hold SEVEN (7) days prior to procedure.   If on DAILY dosing, hold on the day of procedure.   Dulaglutide (Trulicity)  Exenatide (Bydureon BCise, Byetta)  Liraglutide (Victoza, Saxenda)  Pramlintide acetate (Symlin)  Semaglutide (Ozempic, Wegovy, Rybelsus)  Tirzepatide (Mounjaro)  Liraglutide + insulin Degludec (Xultophy)  Lixisenatide + insulin Glargine (Soliqua)    Do not take SEVEN (7) days prior to procedure.    Benzphetamine  Diethylpropion  Phendimetrazine  Phentermine (Adipex, Lomaira)  Phentermine/topiramate (Qsymia)...  Note: to prevent seizures from abrupt withdrawal, take a dose every other day for at least 1 week before stopping treatment.    ===========================================================================    Herbs and Dietary Supplements    Do not take SEVEN (7) days prior to procedure.        Alfalfa    American ginseng    Anette    Anise    Arnica montana    Asafetida    [place] bark    Bilberry    Birch    Black cohosh    Bladderwrack    Bogbean    Boldo    Borage seed oil    Bromelain    Capsicum    Cat's claw    Celery     Chamomile   [place]    Clove    Coleus    Cordyceps       Dandelion     Danshen    Devil's claw    Dong quai    EPA (eicosapentaenoic    acid, found in fish oils)    Evening primrose oil    Fenugreek    Feverfew    Fish oil    Flaxseed/flax powder     Garlic    Karen    Ginkgo biloba    Grapefruit juice     Grapeseed     Green tea    Guggul    Gymnestra    Horse chestnut    Horseradish    Licorie root    Lovage root    Male fern    Meadowsweet    Melatonin    Nordihydroguairetic acid (NDGA)    Omega-3 fatty acids    Onion    Papain    Panax ginseng    Parsley      Passionflower    Poplar   Prickly noemi    Propolis    Quassia    Red clover    Reishi    Saw palmetto    Siberian ginseng    Sweet clover    Rue    Sweet birch     Turmeric    Vitamin E    White willow    Wild carrot    Wild lettuce    [place]    [place]    Yucca      3

## 2024-01-15 NOTE — OB PROVIDER TRIAGE NOTE - HISTORY OF PRESENT ILLNESS
Patient is a 28 year old  at 33w5d who presents to triage with complaint of nausea, vomiting, and diarrhea since 2am.  States that she went out to eat with her  last night and he developed the same symptoms after eating the same chicken and pasta.  Denies vaginal bleeding, leakage of fluid, decreased fetal movement, or any other concerns.  PNC with [facility]  PMhx: Denies  Sxhx: Denies  Meds: PNV  Allergies: NKDA  OBHx;  Preg - 2023 - First trimester complete SAB- suspected molar pregnancy  Gynhx: normal menses, one partner, no stds, no cysts, no fibroids, normal paps  SocialHx: neg x 3, no anxiety or depression     Patient is a 28 year old  at 33w5d who presents to triage with complaint of nausea, vomiting, and diarrhea since 2am.  States that she went out to eat with her  last night and he developed the same symptoms after eating the same chicken and pasta.  Denies vaginal bleeding, leakage of fluid, decreased fetal movement, or any other concerns.  PNC with [facility]  PMhx: Denies  Sxhx: Denies  Meds: PNV  Allergies: NKDA  OBHx;  Preg - 2023 - First trimester complete SAB- suspected molar pregnancy  Gynhx: normal menses, one partner, no stds, no cysts, no fibroids, normal paps  SocialHx: neg x 3, no anxiety or depression     Patient is a 28 year old  at 33w5d who presents to triage with complaint of nausea, vomiting, and diarrhea since 2am.  States that she went out to eat with her  last night and he developed the same symptoms after eating the same chicken and pasta.  Denies vaginal bleeding, leakage of fluid, decreased fetal movement, or any other concerns.  PNC with [facility]  PMhx: Denies  Sxhx: Denies  Meds: PNV  Allergies: NKDA  OBHx;  Preg - 2023 - First trimester complete SAB- suspected molar pregnancy  Gynhx: normal menses, one partner, no stds, no cysts, no fibroids, normal paps  SocialHx: neg x 3, no anxiety or depression

## 2024-01-15 NOTE — OB RN TRIAGE NOTE - FALL HARM RISK - UNIVERSAL INTERVENTIONS
Bed in lowest position, wheels locked, appropriate side rails in place/Call bell, personal items and telephone in reach/Instruct patient to call for assistance before getting out of bed or chair/Non-slip footwear when patient is out of bed/[place] to call system/Physically safe environment - no spills, clutter or unnecessary equipment/Purposeful Proactive Rounding/Room/bathroom lighting operational, light cord in reach Bed in lowest position, wheels locked, appropriate side rails in place/Call bell, personal items and telephone in reach/Instruct patient to call for assistance before getting out of bed or chair/Non-slip footwear when patient is out of bed/[place] to call system/Physically safe environment - no spills, clutter or unnecessary equipment/Purposeful Proactive Rounding/Room/bathroom lighting operational, light cord in reach Bed in lowest position, wheels locked, appropriate side rails in place/Call bell, personal items and telephone in reach/Instruct patient to call for assistance before getting out of bed or chair/Non-slip footwear when patient is out of bed/[place] to call system/Physically safe environment - no spills, clutter or unnecessary equipment/Purposeful Proactive Rounding/Room/bathroom lighting operational, light cord in reach

## 2024-01-15 NOTE — OB PROVIDER TRIAGE NOTE - CURRENT PREGNANCY COMPLICATIONS, OB PROFILE
Pt is a 30y/o male here for sudden left flank pain with radiation to left testicle and urinary frequency since thiS morning. Pt denies n/v, fever, chills, CP, SOB None

## 2024-01-15 NOTE — OB PROVIDER TRIAGE NOTE - NSHPPHYSICALEXAM_GEN_ALL_CORE
Gen: A&Ox3, NAD  Chest: CTABL   Cardiac: S1+S2+ RRR  Abdomen: Soft, nontender, +BS, no guarding, no rebound, gravid uterus   Extremities: Nontender, no worsening edema, DTRS 2+

## 2024-01-17 DIAGNOSIS — Z3A.33 33 WEEKS GESTATION OF PREGNANCY: ICD-10-CM

## 2024-01-17 DIAGNOSIS — O26.893 OTHER SPECIFIED PREGNANCY RELATED CONDITIONS, THIRD TRIMESTER: ICD-10-CM

## 2024-01-17 DIAGNOSIS — R19.7 DIARRHEA, UNSPECIFIED: ICD-10-CM

## 2024-01-17 DIAGNOSIS — O09.293 SUPERVISION OF PREGNANCY WITH OTHER POOR REPRODUCTIVE OR OBSTETRIC HISTORY, THIRD TRIMESTER: ICD-10-CM

## 2024-01-17 DIAGNOSIS — O21.2 LATE VOMITING OF PREGNANCY: ICD-10-CM

## 2024-04-20 ENCOUNTER — EMERGENCY (EMERGENCY)
Facility: HOSPITAL | Age: 29
LOS: 1 days | Discharge: HOME | End: 2024-04-20
Attending: EMERGENCY MEDICINE
Payer: MEDICAID

## 2024-04-20 VITALS
RESPIRATION RATE: 18 BRPM | TEMPERATURE: 98 F | DIASTOLIC BLOOD PRESSURE: 79 MMHG | HEART RATE: 90 BPM | OXYGEN SATURATION: 98 % | WEIGHT: 169.76 LBS | SYSTOLIC BLOOD PRESSURE: 114 MMHG

## 2024-04-20 PROCEDURE — 99283 EMERGENCY DEPT VISIT LOW MDM: CPT

## 2024-04-20 PROCEDURE — L9991: CPT

## 2024-04-20 NOTE — ED ADULT TRIAGE NOTE - CHIEF COMPLAINT QUOTE
wound from C section 35 weeks ago with irritation and some drainage and hemorrhoids bleeding this morning from hard stools

## 2024-04-21 RX ORDER — HYDROCORTISONE 1 %
1 OINTMENT (GRAM) TOPICAL
Qty: 30 | Refills: 0
Start: 2024-04-21

## 2024-04-21 RX ORDER — BACITRACIN ZINC 500 UNIT/G
1 OINTMENT IN PACKET (EA) TOPICAL
Qty: 1 | Refills: 0
Start: 2024-04-21

## 2024-04-21 NOTE — ED PROVIDER NOTE - DISPOSITION TYPE
Patient with AZUCENA DISCHARGE

## 2024-04-21 NOTE — ED PROVIDER NOTE - NSFOLLOWUPINSTRUCTIONS_ED_ALL_ED_FT
Use saline wound wash (over-the-counter) for  wound after cleansing with soap and water.    Apply Bacitracin antibiotic ointment twice daily to the area.    For hemorrhoids, use prescription Anusol-HC twice daily.              English    Hemorrhoids  The colon, with 3 close-ups of the rectum. One is normal and the other 2 show external and internal hemorrhoids.  Hemorrhoids are swollen veins in and around the rectum or the opening of the butt (anus). There are two types of hemorrhoids:  Internal. These occur in the veins just inside the rectum. They may poke through to the outside and become irritated and painful.  External. These occur in the veins outside the anus. They can be felt as a painful swelling or hard lump near the anus.  Most hemorrhoids do not cause severe problems. Often, they can be treated at home with diet and lifestyle changes. If home treatments do not help, you may need a procedure to shrink or remove the hemorrhoids.    What are the causes?  Hemorrhoids are caused by pressure near the anus. This pressure may be caused by:  Constipation or diarrhea.  Straining to poop.  Pregnancy.  Obesity.  Sitting or riding a bike for a long time.  Heavy lifting or other things that cause you to strain.  Anal sex.  What are the signs or symptoms?  Symptoms of this condition include:  Pain.  Anal itching or irritation.  Bleeding from the rectum.  Leakage of poop (stool).  Swelling of the anus.  One or more lumps around the anus.  How is this diagnosed?  Hemorrhoids can often be diagnosed through a visual exam. Other exams or tests may also be done, such as:  A digital rectal exam. This is when your health care provider feels inside your rectum with a gloved finger.  Anoscope. This is an exam of the anus using a small tube.  A blood test, if you have lost a lot of blood.  A sigmoidoscopy or colonoscopy. These are tests to look inside the colon using a tube with a camera on the end.  How is this treated?  In most cases, hemorrhoids can be treated at home with diet and lifestyle changes. If these changes do not help, you may need to have a procedure done. These procedures can make the hemorrhoids smaller or fully remove them. Common procedures include:  Rubber band ligation. Rubber bands are placed at the base of the hemorrhoids to cut off their blood supply.  Sclerotherapy. Medicine is put into the hemorrhoids to shrink them.  Infrared coagulation. A type of light energy is used to get rid of the hemorrhoids.  Hemorrhoidectomy surgery. The hemorrhoids are removed during surgery. Then, the veins that supply them are tied off.  Stapled hemorrhoidopexy surgery. The base of the hemorrhoid is stapled to the wall of the rectum.  Follow these instructions at home:  Medicines    Take over-the-counter and prescription medicines only as told by your provider.  Use medicated creams or medicines that are put in the rectum (suppositories) as told by your provider.  Eating and drinking    Examples of vegetables and other plant-based foods.  Eat foods that are high in fiber, such as beans, whole grains, and fresh fruits and vegetables.  Ask your provider about taking products that have fiber added to them (fiber supplements).  Reduce the amount of fat in your diet. You can do this by eating low-fat dairy products, eating less red meat, and avoiding processed foods.  Drink enough fluid to keep your pee (urine) pale yellow.  Managing pain and swelling    A bathtub partially filled with water.  Take warm sitz baths for 20 minutes, 3–4 times a day. This can help ease pain and discomfort. You may do this in a bathtub or you can use a portable sitz bath that fits over the toilet.  If told, put ice on the affected area. It may help to use ice packs between sitz baths.  Put ice in a plastic bag.  Place a towel between your skin and the bag.  Leave the ice on for 20 minutes, 2–3 times a day.  If your skin turns bright red, remove the ice right away to prevent skin damage. The risk of damage is higher if you cannot feel pain, heat, or cold.  General instructions    Exercise. Ask your provider how much and what kind of exercise is best for you. In general, you should do moderate exercise for at least 30 minutes on most days of the week (150 minutes each week). You may want to try walking, biking, or yoga.  Go to the bathroom when you have the urge to poop. Do not wait.  Avoid straining to poop.  Keep the anus dry and clean. Use wet toilet paper or moist towelettes after you poop.  Do not sit on the toilet for a long time. This can increase blood pooling and pain.  Where to find more information  National [place] of Diabetes and Digestive and Kidney Diseases: niddk.nih.gov  Contact a health care provider if:  You have more pain and swelling that do not get better with treatment.  You have trouble pooping or you are not able to poop.  You have pain or inflammation outside the area of the hemorrhoids.  Get help right away if:  You are bleeding from your rectum and you cannot get it to stop.  This information is not intended to replace advice given to you by your health care provider. Make sure you discuss any questions you have with your health care provider.    Document Revised: 2023 Document Reviewed: 2023  [employer] Patient Education ©  [employer].  [employer] logo  Terms and Conditions  Privacy Policy  Editorial Policy  All content on this site: Copyright ©  [employer], its licensors, and contributors. All rights are reserved, including those for text and data mining, AI training, and similar technologies. For all open access content, the Creative Commons licensing terms apply.  Cookies are used by this site. To decline or learn more, visit our Cookies page.  [employer]          English     Delivery, Care After  The following information offers guidance on how to care for yourself after your procedure. Your health care provider may also give you more specific instructions. If you have problems or questions, contact your health care provider.    What can I expect after the procedure?  After the procedure, it is common to have:  A small amount of blood or clear fluid coming from the incision.  Some redness, swelling, and pain in your incision area.  Some abdominal pain and soreness.  Vaginal bleeding (lochia). Even though you did not have a vaginal delivery, you will still have vaginal bleeding and discharge.  Pelvic cramps.  Fatigue.  You may have pain, swelling, and discomfort in the tissue between your vagina and your anus (perineum) if:  Your  was unplanned, and you were allowed to labor and push.  An incision was made in the area (episiotomy) or the tissue tore during attempted vaginal delivery.  Follow these instructions at home:  Medicines    Take over-the-counter and prescription medicines only as told by your health care provider.  If you were prescribed an antibiotic medicine, take it as told by your health care provider. Do not stop taking the antibiotic even if you start to feel better.  Ask your health care provider if the medicine prescribed to you requires you to avoid driving or using machinery.  Incision care    Two stitched incisions. One is normal. The other is red with pus and infected.  Follow instructions from your health care provider about how to take care of your incision. Make sure you:  Wash your hands with soap and water for an least 20 seconds before and after you change your bandage (dressing). If soap and water are not available, use hand .  If you have a dressing, change it or remove it as told by your health care provider.  Leave stitches (sutures), skin staples, skin glue, or adhesive strips in place. These skin closures may need to stay in place for 2 weeks or longer. If adhesive strip edges start to loosen and curl up, you may trim the loose edges. Do not remove adhesive strips completely unless your health care provider tells you to do that.  Check your incision area every day for signs of infection. Check for:  More redness, swelling, or pain.  More fluid or blood.  Warmth.  Pus or a bad smell.  Do not take baths, swim, or use a hot tub until your health care provider approves. Ask your health care provider if you may take showers.  When you cough or sneeze, hug a pillow. This helps with pain and decreases the chance of your incision opening up (dehiscing). Do this until your incision heals.  Managing constipation    Your procedure may cause constipation. To prevent or treat constipation, you may need to:  Drink enough fluid to keep your urine pale yellow.  Take over-the-counter or prescription medicines.  Eat foods that are high in fiber, such as beans, whole grains, and fresh fruits and vegetables.  Limit foods that are high in fat and processed sugars, such as fried or sweet foods.  Activity    A sign showing that a person should not lift anything heavy.  If possible, have someone help you care for your baby and help with household activities for at least a few days after you leave the hospital.  Rest as much as possible. Try to rest or take a nap while your baby is sleeping.  You may have to avoid lifting. Ask your health care provider how much you can safely lift.  Return to your normal activities as told by your health care provider. Ask your health care provider what activities are safe for you.  Talk with your health care provider about when you can engage in sexual activity. This may depend on your:  Risk of infection.  How fast you heal.  Comfort and desire to engage in sexual activity.  Lifestyle    Do not drink alcohol. This is especially important if you are breastfeeding or taking pain medicine.  Do not use any products that contain nicotine or tobacco. These products include cigarettes, chewing tobacco, and vaping devices, such as e-cigarettes. If you need help quitting, ask your health care provider.  General instructions    Do not use tampons or douches until your health care provider approves.  Wear loose, comfortable clothing and a supportive and well-fitting bra.  If you pass a blood clot, save it and call your health care provider to discuss. Do not flush blood clots down the toilet before you get instructions from your health care provider.  Keep all follow-up visits for you and your baby. This is important.  Contact a health care provider if:  You have:  A fever.  Dizziness or light-headedness.  Bad-smelling vaginal discharge.  A blood clot pass from your vagina.  Pus, blood, or a bad smell coming from your incision.  An incision that feels warm to the touch.  More redness, swelling, or pain around your incision.  Difficulty or pain when urinating.  Nausea or vomiting.  Little or no interest in activities you used to enjoy.  Your breasts turn red or become painful or hard.  You feel unusually sad or worried.  You have questions about caring for yourself or your baby.  You have redness, swelling, and pain in an arm or leg.  Get help right away if:  You have:  Pain that does not go away or get better with medicine.  Chest pain.  Trouble breathing.  Blurred vision, spots, or flashing lights in your vision.  Thoughts about hurting yourself or your baby.  New pain in your abdomen or in one of your legs.  A severe headache that does not get better with pain medicine.  You faint.  You bleed from your vagina so much that you fill more than one sanitary pad in one hour. Bleeding should not be heavier than your heaviest period.  These symptoms may be an emergency. Get help right away. Call 911.  Do not wait to see if the symptoms will go away.  Do not drive yourself to the hospital.  Get help right away if you feel like you may hurt yourself or others, or have thoughts about taking your own life. Go to your nearest emergency room or:  Call 911.  Call the National Suicide Prevention Lifeline at 1-910.615.9797 or 088. This is open 24 hours a day.  Text the Crisis Text Line at 226488.  Summary  After the procedure, it is common to have pain at your incision site, abdominal cramping, and slight bleeding from your vagina.  Check your incision area every day for signs of infection.  Tell your health care provider about any unusual symptoms.  Keep all follow-up visits for you and your baby. This is important.  This information is not intended to replace advice given to you by your health care provider. Make sure you discuss any questions you have with your health care provider.    Document Revised: 2022 Document Reviewed: 2022  [employer] Patient Education ©  [employer]  [employer] logo  Terms and Conditions  Privacy Policy  Editorial Policy  All content on this site: Copyright ©  [employer], its licensors, and contributors. All rights are reserved, including those for text and data mining, AI training, and similar technologies. For all open access content, the Creative Commons licensing terms apply.  Cookies are used by this site. To decline or learn more, visit our Cookies page.  [employer]

## 2024-04-21 NOTE — ED ADULT NURSE NOTE - OBJECTIVE STATEMENT
As per pt. c/o wound from C section 35 weeks ago with irritation and some drainage and hemorrhoids bleeding this morning from hard stools

## 2024-04-21 NOTE — ED PROVIDER NOTE - GASTROINTESTINAL, MLM
Abdomen soft, non-tender, no guarding.  Chaperoned exam with RN present--external hemorrhoid present, no thrombosis or active bleeding

## 2024-04-21 NOTE — ED PROVIDER NOTE - SKIN, MLM
C-S wound with slight superficial dermal separation but no actual wound dehiscence, no discharge/erythema/swelling/induration

## 2024-04-21 NOTE — ED PROVIDER NOTE - PATIENT PORTAL LINK FT
You can access the FollowMyHealth Patient Portal offered by [facility] by registering at the following website: [email or web address]. By joining [facility]’s FollowMyHealth portal, you will also be able to view your health information using other applications (apps) compatible with our system.

## 2024-04-21 NOTE — ED PROVIDER NOTE - CLINICAL SUMMARY MEDICAL DECISION MAKING FREE TEXT BOX
28-year-old woman status post  5 weeks ago, complains of opening of  wound the past couple days and also small amount of bleeding from the rectum--  On exam the  wound appears to be healing well with only slight superficial separation of skin but no dehiscence of the wound; there is evidence of small external hemorrhoid with no signs of thrombosis or active bleeding.  Advised Anusol–HC and for the  wound bacitracin ointment.  Return precautions given.

## 2024-07-23 NOTE — ED PROVIDER NOTE - IV ALTEPLASE INCLUSION HIDDEN
In an effort to ensure that our patients [employer]Well, a Team Member has reviewed your chart and identified an opportunity to provide the best care possible. An attempt was made to discuss or schedule due or overdue Preventive or Chronic Condition care.    The Outcome was Contact was not made, letter/portal message sent.  We are attempting to schedule a appointment rosa a pap. If you have any questions or need help with scheduling, contact your primary care provider.. Care Gaps identified: Cervical Cancer Screening.    Appointment needed:  Patient has an appt for pap  
show

## 2024-09-08 NOTE — ED PROVIDER NOTE - MEDICAL DECISION MAKING DETAILS
23 yo healthy fem with 3 weeks of worsening symptoms, despite being tx with augmenting and prednisone, with fever/tachycardia in ED. will eval labs, cxr, urine, give fluids and antipyretics, and re-eval. (M6) obeys commands

## 2025-05-17 NOTE — ED ADULT NURSE NOTE - NSHOSCREENINGQ1_ED_ALL_ED
- Adherent to PO iron. S/p Venofer 4/5. Dose 5/5 scheduled for next week  - Hgb improved to 10.1 last week   No